# Patient Record
Sex: FEMALE | Race: BLACK OR AFRICAN AMERICAN | NOT HISPANIC OR LATINO | Employment: FULL TIME | URBAN - METROPOLITAN AREA
[De-identification: names, ages, dates, MRNs, and addresses within clinical notes are randomized per-mention and may not be internally consistent; named-entity substitution may affect disease eponyms.]

---

## 2019-07-26 ENCOUNTER — OFFICE VISIT (OUTPATIENT)
Dept: NEUROLOGY | Facility: CLINIC | Age: 54
End: 2019-07-26
Payer: COMMERCIAL

## 2019-07-26 VITALS
DIASTOLIC BLOOD PRESSURE: 78 MMHG | BODY MASS INDEX: 32.19 KG/M2 | WEIGHT: 181.69 LBS | HEIGHT: 63 IN | HEART RATE: 63 BPM | SYSTOLIC BLOOD PRESSURE: 108 MMHG

## 2019-07-26 DIAGNOSIS — R93.0 ABNORMAL MRI OF HEAD: Primary | ICD-10-CM

## 2019-07-26 DIAGNOSIS — G44.89 CHRONIC MIXED HEADACHE SYNDROME: ICD-10-CM

## 2019-07-26 DIAGNOSIS — G57.12 MERALGIA PARESTHETICA OF LEFT SIDE: ICD-10-CM

## 2019-07-26 PROCEDURE — 99999 PR PBB SHADOW E&M-NEW PATIENT-LVL III: CPT | Mod: PBBFAC,,, | Performed by: PSYCHIATRY & NEUROLOGY

## 2019-07-26 PROCEDURE — 99204 OFFICE O/P NEW MOD 45 MIN: CPT | Mod: S$GLB,,, | Performed by: PSYCHIATRY & NEUROLOGY

## 2019-07-26 PROCEDURE — 3008F BODY MASS INDEX DOCD: CPT | Mod: CPTII,S$GLB,, | Performed by: PSYCHIATRY & NEUROLOGY

## 2019-07-26 PROCEDURE — 99204 PR OFFICE/OUTPT VISIT, NEW, LEVL IV, 45-59 MIN: ICD-10-PCS | Mod: S$GLB,,, | Performed by: PSYCHIATRY & NEUROLOGY

## 2019-07-26 PROCEDURE — 3008F PR BODY MASS INDEX (BMI) DOCUMENTED: ICD-10-PCS | Mod: CPTII,S$GLB,, | Performed by: PSYCHIATRY & NEUROLOGY

## 2019-07-26 PROCEDURE — 99999 PR PBB SHADOW E&M-NEW PATIENT-LVL III: ICD-10-PCS | Mod: PBBFAC,,, | Performed by: PSYCHIATRY & NEUROLOGY

## 2019-07-26 RX ORDER — ALPRAZOLAM 1 MG/1
TABLET ORAL
Qty: 3 TABLET | Refills: 0 | Status: SHIPPED | OUTPATIENT
Start: 2019-07-26

## 2019-07-26 NOTE — PROGRESS NOTES
"Subjective:      Patient ID: Kuldeep Chapa is a 54 y.o. female.    Chief Complaint: I have an abnormal MRI and have pain in my eyes    The patient states that  She was seen several years ago by a neurologist because of a severe headache that it been present for least 3 weeks.  As a result of that complaint, the patient underwent an MRI of the brain and was told that the MRI was abnormal because there were "spots" on the images.  The patient states that she had is not recall having any significant neurological complaints other than the headache.    Recently, however she has been experiencing bilateral orbital pain which occurs several times a week lasting up to 2-3 hours at a time and then resolving.  This pain complaint has been present for the past several months.  She has been seen by an optometrist and told that her eye examination was normal.  The patient is not aware of any vision loss in 1 eye or the E the other.  She is not aware of any diplopia.  She denies any ptosis of the eyelids.  The patient states that she has is concerned because she had the abnormal MRI and now seems to be having some difficulty with her eyes.  She has not noticed any significant change in visual acuity however.    The patient does have a history of numbness in her left lateral thigh and had been seen by a neurologist several years ago.  The patient had a diagnosis of meralgia paresthetica made but did not respond to attempts at pain management.  The patient states that she continues to experience numbness in the lateral thigh.  At the time of the original complaint, the patient had numbness but then had undergone hernia repair following which, the pain in the lateral thigh became much worse.  She has been on gabapentin.  According to review of the medical records at that time, a nerve block was not very successful.    In the interval since her original MRI, the patient has not noticed any weakness, off word nose, or clumsiness of " her extremities.  She is not aware of any incontinence or other changes in bowel and bladder function.  She denies any difficulty with articulation.  She is not aware of any dysphagia.  The patient is not aware of numbness in other areas other than the lateral thigh area on the left.  She denies any easy fatigue.    However with above symptoms, she has been conversing with multiple members of the community who have multiple sclerosis, and now the patient is concerned that she may have multiple sclerosis.          ROS:  GENERAL: NO FEVER, CHILLS, FATIGABILITY OR WEIGHT LOSS.  SKIN: NO RASHES, ITCHING OR CHANGES IN COLOR OR TEXTURE OF SKIN.  HEAD: NO HEADACHES OR RECENT HEAD TRAUMA.  EYES: VISUAL ACUITY FINE. NO PHOTOPHOBIA, OCULAR PAIN OR DIPLOPIA.  EARS: DENIES EAR PAIN, DISCHARGE OR VERTIGO.  NOSE: NO LOSS OF SMELL, NO EPISTAXIS OR POSTNASAL DRIP.  MOUTH & THROAT: NO HOARSENESS OR CHANGE IN VOICE. NO EXCESSIVE GUM BLEEDING.  NODES: DENIES SWOLLEN GLANDS.  CHEST: DENIES ADDISON, CYANOSIS, WHEEZING, COUGH AND SPUTUM PRODUCTION.  CARDIOVASCULAR: DENIES CHEST PAIN, PND, ORTHOPNEA OR REDUCED EXERCISE TOLERANCE.  ABDOMEN: APPETITE FINE. NO WEIGHT LOSS. DENIES DIARRHEA, ABDOMINAL PAIN, HEMATEMESIS OR BLOOD IN STOOL.  URINARY: NO FLANK PAIN, DYSURIA OR HEMATURIA.  PERIPHERAL VASCULAR: NO CLAUDICATION OR CYANOSIS.  MUSCULOSKELETAL: NO JOINT STIFFNESS OR SWELLING. DENIES BACK PAIN.  NEUROLOGIC: NO HISTORY OF SEIZURES, PARALYSIS, ALTERATION OF GAIT OR COORDINATION.    History reviewed. No pertinent past medical history.  Past Surgical History:   Procedure Laterality Date    INGUINAL HERNIA REPAIR Left 11/11/15     History reviewed. No pertinent family history.  Social History     Socioeconomic History    Marital status:      Spouse name: Not on file    Number of children: Not on file    Years of education: Not on file    Highest education level: Not on file   Occupational History    Not on file   Social Needs     Financial resource strain: Not on file    Food insecurity:     Worry: Not on file     Inability: Not on file    Transportation needs:     Medical: Not on file     Non-medical: Not on file   Tobacco Use    Smoking status: Never Smoker    Smokeless tobacco: Never Used   Substance and Sexual Activity    Alcohol use: No     Alcohol/week: 0.0 oz    Drug use: No    Sexual activity: Yes     Partners: Male   Lifestyle    Physical activity:     Days per week: Not on file     Minutes per session: Not on file    Stress: Not on file   Relationships    Social connections:     Talks on phone: Not on file     Gets together: Not on file     Attends Yazdanism service: Not on file     Active member of club or organization: Not on file     Attends meetings of clubs or organizations: Not on file     Relationship status: Not on file   Other Topics Concern    Not on file   Social History Narrative    Not on file         Objective:   PE:   VITAL SIGNS:   Height 5 ft 3 in, weight 82.4 kg, BMI 32.18  Vitals:    07/26/19 1306   BP: 108/78   Pulse: 63     APPEARANCE: WELL NOURISHED, WELL DEVELOPED, IN NO ACUTE DISTRESS.    HEAD: NORMOCEPHALIC, ATRAUMATIC.  EYES: PERRL. EOMI.  NON-ICTERIC SCLERAE.    EARS: TM'S INTACT. LIGHT REFLEX NORMAL. NO RETRACTION OR PERFORATION.    NOSE: MUCOSA PINK. AIRWAY CLEAR.  MOUTH & THROAT: NO TONSILLAR ENLARGEMENT. NO PHARYNGEAL ERYTHEMA OR EXUDATE. NO STRIDOR.  NECK: SUPPLE. NO BRUITS.  CHEST: LUNGS CLEAR TO AUSCULTATION.  CARDIOVASCULAR: REGULAR RHYTHM WITHOUT SIGNIFICANT MURMURS.  ABDOMEN: BOWEL SOUNDS NORMAL. NOT DISTENDED. SOFT. NO TENDERNESS OR MASSES.  MUSCULOSKELETAL:  NO BONY DEFORMITY SEEN.  MUSCLE TONE AND MUSCLE MASS ARE NORMAL IN BOTH UPPER AND BOTH LOWER EXTREMITIES.    NEUROLOGIC:     MENTAL STATUS: THE PATIENT IS ORIENTED TO PERSON, PLACE, TIME.  THE PATIENT IS ABLE TO RECALL 3 UNRELATED WORDS AT 3 MINUTES WITHOUT DIFFICULTY.  THE PATIENT IS ABLE TO FOCUS ON THE EXAM AND FOLLOW 3-STEP  COMMANDS WITHOUT DIFFICULTY.  THE PATIENT HAS INTACT LANGUAGE FUNCTION INCLUDING NAMING AND SYNTAX.  THE PATIENT SHOWS GOOD AWARENESS OF CURRENT EVENTS AND HAS A GOOD FUND OF GENERAL KNOWLEDGE.  THE PATIENT IS PLEASANT AND COOPERATIVE FOR THE EXAMINATION.    CRANIAL NERVES:  II: VISUAL FIELDS ARE INTACT BY CONFRONTATION.  THE PATIENT IS ABLE TO PERCEIVE THE COLOR RED AS THE SAME INTENSITY IN EACH EYE.  FUNDUSCOPIC EXAMINATION DOES NOT SHOW ANY EVIDENCE OF PAPILLEDEMA, HEMORRHAGE, OR EXUDATE.  VISUAL ACUITY WITH HAND HELD CHART IS  20/30 LEFT EYE, RIGHT EYE, AND 20/20 BOTH EYES.  VISUAL ACUITIES CHECKED WITH THE PATIENT'S GLASSES ON.  III, IV, VI: THE EXTRA OCULAR MUSCLES ARE INTACT IN THE CARDINAL DIRECTIONS OF GAZE.  NO PTOSIS IS PRESENT.  PUPILS ARE BRISKLY REACTIVE TO LIGHT BILATERALLY.  V: FACIAL SENSATION IS INTACT TO LIGHT TOUCH IN ALL 3 DIVISIONS OF THE FIFTH CRANIAL NERVE.  MASSETER FUNCTION IS EQUALLY STRONG.  CORNEAL REFLEXES ARE PRESENT AND BRISK.  VII: FACIAL FEATURES ARE SYMMETRICAL.  THE PATIENT HAS A SYMMETRICAL GRIMACE, FORCED EYELID CLOSURE, AND FOR HEAD ELEVATION.  VIII: HEARING IS INTACT TO VOICE AND FINGER RUB.  IX, X: THE PATIENT'S UVULA ELEVATES IN THE MIDLINE.  UPON PHONATION, THERE IS SYMMETRICAL ELEVATION OF THE PALATE.  GAG REFLEX IS INTACT BILATERALLY.  XI: THE PATIENT HAS A SYMMETRICAL SHOULDER SHRUG.  THE STERNOCLEIDOMASTOID MUSCLES ARE SYMMETRICALLY STRONG.  NO ATROPHY IS PRESENT.  XII: THE PATIENT'S TONGUE PROTRUDES IN THE MIDLINE.  THERE IS NO ATROPHY PRESENT.  ARTICULATION IS CLEAR WITHOUT DYSARTHRIA.    GAIT AND STATION: ROMBERG IS NEGATIVE.  THE PATIENT AMBULATES WITH A GOOD ALTERNATE ARM SWING.  THE PATIENT IS ABLE TO HEEL WALK AND TOE WALK.    MOTOR: MANUAL MUSCLE TESTING OF BOTH UPPER AND LOWER EXTREMITIES SHOWS GRADE 5/5 STRENGTH BILATERALLY.  THE PATIENT'S STRENGTH IS NORMAL FOR AGE AND BODY HABITUS.  EXAMINATION OF THE EXTREMITIES DOES NOT SHOW ANY EVIDENCE OF ATROPHY OR  OTHER DEFORMITY.  MUSCLE TONE IS NORMAL BOTH UPPER AND LOWER EXTREMITIES.    SENSORY: THE PATIENT HAS INTACT PERCEPTION OF PINPRICK, LIGHT TOUCH, AND VIBRATION IN BOTH UPPER AND LOWER EXTREMITIES.  POSITION SENSE IS INTACT IN BOTH UPPER AND LOWER EXTREMITIES.    CEREBELLAR: THE PATIENT IS ABLE TO PERFORM FINGER-TO-NOSE AND HEEL-TO-SHIN WITHOUT DIFFICULTY.  NO INVOLUNTARY MOVEMENTS ARE SEEN AT REST.  MUSCLE TONE IS NORMAL.  RAPID ALTERNATING MOVEMENTS ARE DONE WITHOUT DIFFICULTY.  COORDINATION OF ALTERNATING MOVEMENTS IS NORMAL.    REFLEXES: STRETCH REFLEXES INCLUDING BICEPS, TRICEPS, KNEES, AND ANKLES IS NORMAL BILATERALLY.  PLANTAR STIMULATION IS FLEXOR BILATERALLY.  NO PATHOLOGICAL REFLEXES ARE SEEN.      Assessment:     Encounter Diagnoses   Name Primary?    Abnormal MRI of head Yes    Meralgia paresthetica of left side     Chronic mixed headache syndrome        Discussion:   The patient does need follow-up because of the abnormalities that were described on the previous MRI.  Unfortunately,  The previous MRI and the report from the previous MRI are not available at the time of this consultation.  The patient is voicing concern about the possibility of demyelinating disease.    Plan:     1.  Schedule MRI brain  2. I have tried to reassure the patient that the chances of this being a significant demyelinating disease is very slim but that we will check the MRI to make certain.      This was a 50 min visit with the patient with over 50% of the time spent counseling the patient regarding the expected findings in history and examination of multiple sclerosis and discussion of the diagnosis of multiple sclerosis.    This note is generated with speech recognition software and is subject to transcription error and sound alike phrases that may be missed by proofreading.

## 2019-07-30 ENCOUNTER — TELEPHONE (OUTPATIENT)
Dept: NEUROLOGY | Facility: CLINIC | Age: 54
End: 2019-07-30

## 2019-07-30 DIAGNOSIS — G44.89 CHRONIC MIXED HEADACHE SYNDROME: ICD-10-CM

## 2019-07-30 NOTE — TELEPHONE ENCOUNTER
----- Message from Christine Barriga MA sent at 7/30/2019  8:19 AM CDT -----  Contact: pt  Please advise pt wants to go to radiology specialist on [facility] where her MRI will be cheaper.i have the numbers to fax orders/   ----- Message -----  From: Lucia Christy  Sent: 7/30/2019   6:35 AM  To: Raul COLEMAN Staff    Please call pt @ [phone] regarding an MRI, states it cost 1200 to do MRI at [facility], pt need to change facility, MRI has been cancel, please call pt [phone].

## 2019-08-09 ENCOUNTER — TELEPHONE (OUTPATIENT)
Dept: NEUROLOGY | Facility: CLINIC | Age: 54
End: 2019-08-09

## 2019-08-09 NOTE — TELEPHONE ENCOUNTER
----- Message from Austin Avalos sent at 8/9/2019  3:31 PM CDT -----  Contact: pt   Pt would like cb in reference to, getting approval for Mri and scheduling.               [phone]

## 2019-10-25 ENCOUNTER — TELEPHONE (OUTPATIENT)
Dept: NEUROLOGY | Facility: CLINIC | Age: 54
End: 2019-10-25

## 2019-10-25 NOTE — TELEPHONE ENCOUNTER
----- Message from Austin Avalos sent at 10/25/2019  1:39 PM CDT -----  Contact: Bruna ( [facility])   Checking on status of MRI authorization , pt is miguel for 10/30 at 6:30 am. pls return call.       [phone]

## 2019-10-29 ENCOUNTER — TELEPHONE (OUTPATIENT)
Dept: NEUROLOGY | Facility: CLINIC | Age: 54
End: 2019-10-29

## 2019-10-29 NOTE — TELEPHONE ENCOUNTER
----- Message from Patti Mejia sent at 10/29/2019  1:10 PM CDT -----  Contact: Maribel Wu request a call back to verify the authorization for an MRI (insurance ) ph: [phone]

## 2019-11-26 ENCOUNTER — TELEPHONE (OUTPATIENT)
Dept: NEUROLOGY | Facility: CLINIC | Age: 54
End: 2019-11-26

## 2019-11-26 NOTE — TELEPHONE ENCOUNTER
----- Message from Faye Deb sent at 11/25/2019  3:25 PM CST -----  Type:  Sooner Apoointment Request    Caller is requesting a sooner appointment.  Caller declined first available appointment listed below.  Caller will not accept being placed on the waitlist and is requesting a message be sent to doctor.  Name of Caller: Monico Light  When is the first available appointment?  January  Symptoms:  MRI followup  Would the patient rather a call back or a response via [facility]?   Call back  Best Call Back Number:  [phone]  Additional Information:   Pt states she is needing an appt for her MRI followup/before the next available//please call//ronda/garry

## 2019-12-05 ENCOUNTER — OFFICE VISIT (OUTPATIENT)
Dept: NEUROLOGY | Facility: CLINIC | Age: 54
End: 2019-12-05
Payer: COMMERCIAL

## 2019-12-05 VITALS
WEIGHT: 186.5 LBS | SYSTOLIC BLOOD PRESSURE: 126 MMHG | HEART RATE: 65 BPM | HEIGHT: 63 IN | BODY MASS INDEX: 33.04 KG/M2 | DIASTOLIC BLOOD PRESSURE: 82 MMHG

## 2019-12-05 DIAGNOSIS — H57.13 EYE PAIN, BILATERAL: ICD-10-CM

## 2019-12-05 DIAGNOSIS — G57.12 MERALGIA PARESTHETICA OF LEFT SIDE: Primary | ICD-10-CM

## 2019-12-05 PROCEDURE — 3008F PR BODY MASS INDEX (BMI) DOCUMENTED: ICD-10-PCS | Mod: CPTII,S$GLB,, | Performed by: PSYCHIATRY & NEUROLOGY

## 2019-12-05 PROCEDURE — 99213 OFFICE O/P EST LOW 20 MIN: CPT | Mod: S$GLB,,, | Performed by: PSYCHIATRY & NEUROLOGY

## 2019-12-05 PROCEDURE — 99999 PR PBB SHADOW E&M-EST. PATIENT-LVL III: ICD-10-PCS | Mod: PBBFAC,,, | Performed by: PSYCHIATRY & NEUROLOGY

## 2019-12-05 PROCEDURE — 3008F BODY MASS INDEX DOCD: CPT | Mod: CPTII,S$GLB,, | Performed by: PSYCHIATRY & NEUROLOGY

## 2019-12-05 PROCEDURE — 99213 PR OFFICE/OUTPT VISIT, EST, LEVL III, 20-29 MIN: ICD-10-PCS | Mod: S$GLB,,, | Performed by: PSYCHIATRY & NEUROLOGY

## 2019-12-05 PROCEDURE — 99999 PR PBB SHADOW E&M-EST. PATIENT-LVL III: CPT | Mod: PBBFAC,,, | Performed by: PSYCHIATRY & NEUROLOGY

## 2019-12-05 NOTE — PROGRESS NOTES
Subjective:      Patient ID: Kuldeep Chapa is a 54 y.o. female.    Chief Complaint:  Follow-up of MRI brain     The patient reports that she continues to have intermittent eye pain.  The patient states that the pain begins abruptly and can affect either the left or right eye.  The pain is felt in and around the eye for a brief period of time, estimated by the patient to be 3-5 minutes.  She has not noticed any associated symptoms with the pain such as ptosis of the eyelid, conjunctiva injection, or excessive tear production.  She denies any change in sweating of the forehead.  With that history, she has recently had MRI of the brain done which was normal.  This has been reported to the patient today.    The patient indicates that her meralgia paresthetica symptoms have largely resolved and are no longer a major issue for her.          ROS:  GENERAL: NO FEVER, CHILLS, FATIGABILITY OR WEIGHT LOSS.  SKIN: NO RASHES, ITCHING OR CHANGES IN COLOR OR TEXTURE OF SKIN.  HEAD: NO RECENT HEAD TRAUMA.  EYES: VISUAL ACUITY FINE. NO PHOTOPHOBIA, OCULAR PAIN OR DIPLOPIA.  EARS: DENIES EAR PAIN, DISCHARGE OR VERTIGO.  NOSE: NO LOSS OF SMELL, NO EPISTAXIS OR POSTNASAL DRIP.  MOUTH & THROAT: NO HOARSENESS OR CHANGE IN VOICE. NO EXCESSIVE GUM BLEEDING.  NODES: DENIES SWOLLEN GLANDS.  CHEST: DENIES ADDISON, CYANOSIS, WHEEZING, COUGH AND SPUTUM PRODUCTION.  CARDIOVASCULAR: DENIES CHEST PAIN, PND, ORTHOPNEA OR REDUCED EXERCISE TOLERANCE.  ABDOMEN: APPETITE FINE. NO WEIGHT LOSS. DENIES DIARRHEA, ABDOMINAL PAIN, HEMATEMESIS OR BLOOD IN STOOL.  URINARY: NO FLANK PAIN, DYSURIA OR HEMATURIA.  PERIPHERAL VASCULAR: NO CLAUDICATION OR CYANOSIS.  MUSCULOSKELETAL: NO JOINT STIFFNESS OR SWELLING. DENIES BACK PAIN.  NEUROLOGIC: NO HISTORY OF SEIZURES, PARALYSIS, ALTERATION OF GAIT OR COORDINATION.    History reviewed. No pertinent past medical history.  Past Surgical History:   Procedure Laterality Date    INGUINAL HERNIA REPAIR Left 11/11/15      History reviewed. No pertinent family history.  Social History     Socioeconomic History    Marital status:      Spouse name: Not on file    Number of children: Not on file    Years of education: Not on file    Highest education level: Not on file   Occupational History    Not on file   Social Needs    Financial resource strain: Not on file    Food insecurity:     Worry: Not on file     Inability: Not on file    Transportation needs:     Medical: Not on file     Non-medical: Not on file   Tobacco Use    Smoking status: Never Smoker    Smokeless tobacco: Never Used   Substance and Sexual Activity    Alcohol use: No     Alcohol/week: 0.0 standard drinks    Drug use: No    Sexual activity: Yes     Partners: Male   Lifestyle    Physical activity:     Days per week: Not on file     Minutes per session: Not on file    Stress: Not on file   Relationships    Social connections:     Talks on phone: Not on file     Gets together: Not on file     Attends Caodaism service: Not on file     Active member of club or organization: Not on file     Attends meetings of clubs or organizations: Not on file     Relationship status: Not on file   Other Topics Concern    Not on file   Social History Narrative    Not on file         Objective:   PE:   VITAL SIGNS:   Height 5 ft 3 in, weight 84.6 kg, BMI 33.04  Vitals:    12/05/19 0946   BP: 126/82   Pulse: 65     APPEARANCE: WELL NOURISHED, WELL DEVELOPED, IN NO ACUTE DISTRESS.    HEAD: NORMOCEPHALIC, ATRAUMATIC.  EYES: PERRL. EOMI.  NON-ICTERIC SCLERAE.      NEUROLOGIC:   MENTAL STATUS:  THE PATIENT IS WELL ORIENTED TO PERSON, TIME, PLACE, AND SITUATION.  THE PATIENT IS ATTENTIVE TO THE ENVIRONMENT AND COOPERATIVE FOR THE EXAM.  CRANIAL NERVES: II-XII GROSSLY INTACT. FUNDOSCOPIC EXAM IS NORMAL.  NO HEMORRHAGE, EXUDATE OR PAPILLEDEMA IS PRESENT. THE EXTRAOCULAR MUSCLES ARE INTACT IN THE CARDINAL DIRECTIONS OF GAZE.  NO PTOSIS IS PRESENT. FACIAL FEATURES ARE  SYMMETRICAL.  SPEECH IS NORMAL IN FLUENCY, DICTION, AND PHRASING.  TONGUE PROTRUDES IN THE MIDLINE.    GAIT AND STATION:  ROMBERG IS NEGATIVE.  GOOD ALTERNATE ARMSWING WITH NORMAL GAIT.  MOTOR:  NO DOWNDRIFT OF EITHER ARM WHEN HELD AT SHOULDER LEVEL.  MANUAL MUSCLE TESTING OF PROXIMAL AND DISTAL MUSCLES OF BOTH UPPER AND LOWER EXTREMITIES IS NORMAL. MUSCLE MASS IS NORMAL.  MUSCLE TONE IS NORMAL.  SENSORY:  INTACT BOTH UPPER AND LOWER EXTREMITIES TO PIN PRICK, TOUCH, AND VIBRATION.  CEREBELLAR:  FINGER TO NOSE DONE WELL.  ALTERNATING MOVEMENTS INTACT.  NO INVOLUNTARY MOVEMENTS OR TREMOR SEEN.  REFLEXES:  STRETCH REFLEXES ARE 2+ BOTH UPPER AND LOWER EXTREMITIES.  PLANTAR STIMULATION IS FLEXOR BILATERALLY AND NO PATHOLOGICAL REFLEXES ARE SEEN              Assessment:     Encounter Diagnoses   Name Primary?    Meralgia paresthetica of left side Yes    Eye pain, bilateral       Discussion:   The images from the recent MRI of the brain are not available  At the time of this visit.  However the report indicates that the MRI is completely normal.  This was reported to the patient.  It was suggested to the patient that she should see her ophthalmologist to make certain that intra-ocular pressure measurements are normal.      Plan:     1.  The patient is requested to see Ophthalmology for the an evaluation including measurement of intra-ocular pressure.  2.  Return to Neurology as needed.      This was a 25 min face-to-face visit with the patient with over 50% time spent counseling the patient regarding her normal MRI and the significance of normal MRI in regards to her symptoms.  This note is generated with speech recognition software and is subject to transcription error and sound alike phrases that may be missed by proofreading.